# Patient Record
(demographics unavailable — no encounter records)

---

## 2017-09-23 NOTE — DIAGNOSTIC IMAGING REPORT
Indication: Chest pain



Technique: XRAY CHEST 1 V



Comparison: None



Findings: There is poor inspiration with bronchovascular crowding. 

Cardiomediastinal

silhouette is within normal limits. There is no consolidation or pleural 

effusion.

Osseous structures demonstrate no acute abnormality.



Impression:



Poor inspiration with bronchovascular crowding. Mild pulmonary vascular congestion

not excluded. Followup recommended.

## 2017-09-23 NOTE — HISTORY AND PHYSICAL
History of Present Illness


General


Date patient seen:  Sep 23, 2017


Time patient seen:  12:00


Reason for Hospitalization:  Chest Pain





Present Illness


HPI


36y/old  female presented with c/o  right sided chest pain x 1 month, worse 

today 


pain is constantly present 


radiates to the  back 


reports R side chest soreness 


no SOB, no exertional component,  


denies any change with exertion.   


denies fevers, chills, cough, congestion


no wheezing, 


admits to smoking marijuana on a daily basis, no cigarette smoker


denies illicit drug use, ETOH-socially


admits to lots of stress in her life, anxiety  





workup in ED revealed stable VS, pulse oz stable o RA 


labs unremarkable 


ECG with TWI  in lateral leads    


D dimer -321  


urine pregnancy test negative 


CXR  with Poor inspiration with bronchovascular crowding. Mild pulmonary 

vascular congestion not excluded.


mother  at 40  form heart attack


patient is being admitted to r/o ACS


Allergies:  


Coded Allergies:  


     No Known Allergies (Unverified , 17)





Medication History


Scheduled


No Known Medications* (NKM - No Known Medications*), 0 ., (Reported)





Patient History


Healthcare decision maker





Resuscitation status





Advanced Directive on File








Review of Systems


Constitutional:  Reports: no symptoms


Eye:  Reports: no symptoms


ENT:  Reports: no symptoms


Respiratory:  Reports: see HPI


Cardiovascular:  Reports: see HPI


Gastrointestinal:  Reports: no symptoms


Genitourinary:  Reports: no symptoms


Musculoskeletal:  Reports: no symptoms


Skin:  Reports: no symptoms


Psychiatric:  Reports: other - anxiety


Neurological:  Reports: no symptoms


Hematologic/Lymphatic:  Reports: no symptoms





Physical Exam


General Appearance:  no apparent distress, obese - A/A/O x 4 AA female


Lines, tubes and drains:  peripheral


HEENT:  normocephalic, atraumatic, anicteric, mucous membranes moist, PERRL


Neck:  non-tender, supple


Respiratory/Chest:  chest wall non-tender, lungs clear, normal breath sounds, 

no respiratory distress, no accessory muscle use


Cardiovascular/Chest:  normal peripheral pulses, normal rate - SR on jono  with 

TWI lateral leads , regular rhythm, no JVD


Abdomen:  normal bowel sounds, non tender, soft


Extremities:  normal range of motion, non-tender, normal inspection, no calf 

tenderness, normal capillary refill, no edema


Skin Exam:  normal pigmentation, warm/dry


Neurologic:  CNs II-XII grossly normal, no motor/sensory deficits, alert, 

oriented x 3, responsive, depressed affect





Last 24 Hour Vital Signs








  Date Time  Temp Pulse Resp B/P (MAP) Pulse Ox O2 Delivery O2 Flow Rate FiO2


 


17 10:45 98.9 59 15 130/74 100 Room Air  


 


17 08:32 99.0 62 20 134/54 100 Room Air  


 


17 08:31  60 20   Room Air  


 


17 08:07 99.1 62 14 145/87 100 Room Air  

















Intake and Output  


 


 17





 18:59 06:59


 


Intake Total 0 ml 


 


Balance 0 ml 


 


  


 


Intake Oral 0 ml 


 


# Voids 1 











Laboratory Tests








Test


  17


08:22 17


08:55


 


White Blood Count


  5.7 K/UL


(4.8-10.8) 


 


 


Red Blood Count


  4.34 M/UL


(4.20-5.40) 


 


 


Hemoglobin


  13.7 G/DL


(12.0-16.0) 


 


 


Hematocrit


  41.9 %


(37.0-47.0) 


 


 


Mean Corpuscular Volume 96 FL (80-99)   


 


Mean Corpuscular Hemoglobin


  31.6 PG


(27.0-31.0)  H 


 


 


Mean Corpuscular Hemoglobin


Concent 32.8 G/DL


(32.0-36.0) 


 


 


Red Cell Distribution Width


  12.8 %


(11.6-14.8) 


 


 


Platelet Count


  240 K/UL


(150-450) 


 


 


Mean Platelet Volume


  7.6 FL


(6.5-10.1) 


 


 


Neutrophils (%) (Auto)


  63.2 %


(45.0-75.0) 


 


 


Lymphocytes (%) (Auto)


  25.6 %


(20.0-45.0) 


 


 


Monocytes (%) (Auto)


  9.1 %


(1.0-10.0) 


 


 


Eosinophils (%) (Auto)


  0.9 %


(0.0-3.0) 


 


 


Basophils (%) (Auto)


  1.2 %


(0.0-2.0) 


 


 


D-Dimer


  321 ng/mL


(<500) 


 


 


Sodium Level


  139 mEQ/L


(135-145) 


 


 


Potassium Level


  3.8 mEQ/L


(3.4-4.9) 


 


 


Chloride Level


  102 mEQ/L


() 


 


 


Carbon Dioxide Level


  27 mEQ/L


(20-30) 


 


 


Anion Gap 10 (5-15)   


 


Blood Urea Nitrogen


  8 mg/dL (7-23)


  


 


 


Creatinine


  0.7 mg/dL


(0.5-0.9) 


 


 


Estimat Glomerular Filtration


Rate > 60 mL/min


(>60) 


 


 


Glucose Level


  120 mg/dL


()  H 


 


 


Calcium Level


  8.7 mg/dL


(8.6-10.2) 


 


 


Total Bilirubin


  0.3 mg/dL


(0.0-1.2) 


 


 


Aspartate Amino Transf


(AST/SGOT) 15 U/L (5-40)  


  


 


 


Alanine Aminotransferase


(ALT/SGPT) 17 U/L (3-33)  


  


 


 


Alkaline Phosphatase


  58 U/L


() 


 


 


Total Creatine Kinase


  259 U/L


()  H 


 


 


Creatine Kinase MB


  1.8 ng/mL (<


3.8) 


 


 


Creatine Kinase MB Relative


Index 0.6  


  


 


 


Troponin I


  < 0.30 ng/mL


(<=0.30) 


 


 


Pro-B-Type Natriuretic Peptide


  20 pg/mL


(0-125) 


 


 


Total Protein


  7.4 g/dL


(6.6-8.7) 


 


 


Albumin


  4.4 g/dL


(3.5-5.2) 


 


 


Globulin 3.0 g/dL   


 


Albumin/Globulin Ratio 1.4 (1.0-2.7)   


 


Urine HCG, Qualitative  Negative  








Height (Feet):  5


Height (Inches):  4.00


Weight (Pounds):  207





Assessment/Plan


Assessment/Plan


ASSESSMENT


chest pain


r/o ACS 


abnormal ECG with TWI laterally 


anxiety/depression


family hx of early cardiac death 


obesity  


marijuana smoker     





PLAN  OF CARE 


tele 


serial troponin


repeat ECG in am 


ECHO 


cardio eval 


pain management with nitro and Morphine prn 


start ASA 


lipid panel


consider psych eval to start on anxiolytic therapy 


 to decrease marijuana use


DVT GI prophylaxis   





case discussed and evaluated by supervising physician











Johnnie (Eugenioronda)Ana NP Sep 23, 2017 12:54

## 2017-09-23 NOTE — EMERGENCY ROOM REPORT
History of Present Illness


General


Chief Complaint:  chest pain


Source:  Patient





Present Illness


HPI


Patient is a 36-year-old female presented after having increased right-sided 

chest pain.  She describes this as a throbbing sensation.  The patient states 

that she had increased right-sided chest soreness.  She denies any change with 

exertion.  She states that this had been present constantly for about a day.  

She prior history of marijuana smoking daily but denies cigarette smoker.  She 

reports having no change in the pain with deep breath.


Allergies:  


Coded Allergies:  


     No Known Allergies (Unverified , 9/23/17)





Patient History


Past Medical History:  see triage record


Reviewed Nursing Documentation:  PMH: Agreed, PSxH: Agreed





Review of Systems


All Other Systems:  negative except mentioned in HPI





Physical Exam


Sp02 EP Interpretation:  reviewed, normal


General Appearance:  normal inspection, well appearing, no apparent distress, 

alert, GCS 15


Head:  atraumatic


ENT:  normal ENT inspection, hearing grossly normal, normal voice


Neck:  normal inspection, full range of motion, supple, no bony tend


Respiratory:  normal inspection, lungs clear, normal breath sounds, no 

respiratory distress, no retraction, no wheezing


Cardiovascular #1:  regular rate, rhythm, no edema


Gastrointestinal:  normal inspection, normal bowel sounds, non tender, soft, no 

guarding, no hernia


Genitourinary:  no CVA tenderness


Musculoskeletal:  normal inspection, back normal, normal range of motion


Neurologic:  normal inspection, alert, oriented x3, responsive, CNs III-XII nml 

as tested, speech normal


Psychiatric:  normal inspection, judgement/insight normal, mood/affect normal


Skin:  normal inspection, normal color, no rash





Medical Decision Making


Diagnostic Impression:  


 Primary Impression:  


 Chest pain


 Additional Impression:  


 ACS (acute coronary syndrome)


ER Course


Patient presented for a chest pain.  Differential diagnosis included but was 

not limited to acute coronary syndrome, pulmonary embolism, pneumonia, aortic 

dissection, shingles, pneumothorax, aortic dissection, esophageal rupture, 

pericarditis. Because of complexity of patient's case laboratory testing and 

imaging studies were ordered.A d-dimer was noted be negative.  EKG interpreted 

by me showed normal sinus rhythm with a rate of 60s with nonspecific T-wave 

inversion.  Patient was given aspirin as well as pain medication.  A chest x-

ray one view interpreted by me showed low lung volumes without evident 

infiltrate normal cardiac size.  Because  the patient's concerning history 

patient be admitted for further evaluation to Dr. wilde





Labs








Test


  9/23/17


08:22 9/23/17


08:55


 


White Blood Count


  5.7 K/UL


(4.8-10.8) 


 


 


Red Blood Count


  4.34 M/UL


(4.20-5.40) 


 


 


Hemoglobin


  13.7 G/DL


(12.0-16.0) 


 


 


Hematocrit


  41.9 %


(37.0-47.0) 


 


 


Mean Corpuscular Volume 96 FL (80-99)  


 


Mean Corpuscular Hemoglobin


  31.6 PG


(27.0-31.0) 


 


 


Mean Corpuscular Hemoglobin


Concent 32.8 G/DL


(32.0-36.0) 


 


 


Red Cell Distribution Width


  12.8 %


(11.6-14.8) 


 


 


Platelet Count


  240 K/UL


(150-450) 


 


 


Mean Platelet Volume


  7.6 FL


(6.5-10.1) 


 


 


Neutrophils (%) (Auto)


  63.2 %


(45.0-75.0) 


 


 


Lymphocytes (%) (Auto)


  25.6 %


(20.0-45.0) 


 


 


Monocytes (%) (Auto)


  9.1 %


(1.0-10.0) 


 


 


Eosinophils (%) (Auto)


  0.9 %


(0.0-3.0) 


 


 


Basophils (%) (Auto)


  1.2 %


(0.0-2.0) 


 


 


D-Dimer


  321 ng/mL


(<500) 


 


 


Sodium Level


  139 mEQ/L


(135-145) 


 


 


Potassium Level


  3.8 mEQ/L


(3.4-4.9) 


 


 


Chloride Level


  102 mEQ/L


() 


 


 


Carbon Dioxide Level


  27 mEQ/L


(20-30) 


 


 


Anion Gap 10 (5-15)  


 


Blood Urea Nitrogen 8 mg/dL (7-23)  


 


Creatinine


  0.7 mg/dL


(0.5-0.9) 


 


 


Estimat Glomerular Filtration


Rate > 60 mL/min


(>60) 


 


 


Glucose Level


  120 mg/dL


() 


 


 


Calcium Level


  8.7 mg/dL


(8.6-10.2) 


 


 


Total Bilirubin


  0.3 mg/dL


(0.0-1.2) 


 


 


Aspartate Amino Transf


(AST/SGOT) 15 U/L (5-40) 


  


 


 


Alanine Aminotransferase


(ALT/SGPT) 17 U/L (3-33) 


  


 


 


Alkaline Phosphatase


  58 U/L


() 


 


 


Total Creatine Kinase


  259 U/L


() 


 


 


Creatine Kinase MB


  1.8 ng/mL (<


3.8) 


 


 


Creatine Kinase MB Relative


Index 0.6 


  


 


 


Troponin I


  < 0.30 ng/mL


(<=0.30) 


 


 


Pro-B-Type Natriuretic Peptide


  20 pg/mL


(0-125) 


 


 


Total Protein


  7.4 g/dL


(6.6-8.7) 


 


 


Albumin


  4.4 g/dL


(3.5-5.2) 


 


 


Globulin 3.0 g/dL  


 


Albumin/Globulin Ratio 1.4 (1.0-2.7)  


 


Urine HCG, Qualitative  Negative 








Status:  unchanged


Disposition:  ADMITTED AS INPATIENT











Satya Scott Sep 23, 2017 08:10

## 2017-09-24 NOTE — PULMONOLOGY PROGRESS NOTE
Assessment/Plan


Assessment/Plan


ASSESSMENT


chest pain


r/o ACS 


abnormal ECG with TWI laterally 


anxiety/depression


family hx of early cardiac death 


obesity  


marijuana smoker     


hypercholesteremia 





PLAN  OF CARE 


tele 


serial troponin negative


repeat ECG this am -TWI lateral 


ECHO done, results pending 


cardio eval 


pain management with nitro and Morphine prn 


on ASA 


CXR no acute cardiopulmonary pathology


O2 to keep sat above 92%


pulmonary toilet prn


Venous Duplex BLE stat


D dimer WNL


probably low suspicion for PE ( no precipitating factors: no hormonal therapy, 

prolonged immobilization, malignancy),


however, will do CTA  


lipid panel with elevated LDL, 


 on low fat low cholesterol diet and need to lose weight


3 months TLC , then repeat lipid panel if still elevated LDL, start statin 


psych eval pending 


 to decrease marijuana use


DVT GI prophylaxis   





case discussed and evaluated by supervising physician





Subjective


Allergies:  


Coded Allergies:  


     No Known Allergies (Unverified , 9/23/17)


Subjective


still intermittent chest pain


worse with deep inspiration 


on O2 pulse oximetry stable, no tachypnea





Objective





Last 24 Hour Vital Signs








  Date Time  Temp Pulse Resp B/P (MAP) Pulse Ox O2 Delivery O2 Flow Rate FiO2


 


9/24/17 08:24 97.7 74 18 146/94 99 Room Air  


 


9/24/17 04:18 98.0 53 16 137/81  Room Air  


 


9/24/17 04:00  57      


 


9/24/17 00:06 98.1 58 16 137/92  Room Air  


 


9/24/17 00:00  67      


 


9/23/17 20:11 98.0 58 16 133/87  Room Air  


 


9/23/17 20:00  58      


 


9/23/17 16:00  61      


 


9/23/17 14:02 98.8  16 125/68 100 Room Air  


 


9/23/17 13:53 98.8 64 16 125/68 100 Room Air  


 


9/23/17 10:45 98.9 59 15 130/74 100 Room Air  

















Intake and Output  


 


 9/24/17 9/25/17





 19:00 07:00


 


Intake Total 120 ml 


 


Balance 120 ml 


 


  


 


Intake Oral 120 ml 








Objective


General Appearance:  no apparent distress, obese - A/A/O x 4 AA female


Lines, tubes and drains:  peripheral


HEENT:  normocephalic, atraumatic, anicteric, mucous membranes moist, PERRL


Neck:  non-tender, supple


Respiratory/Chest:  chest wall non-tender, lungs clear, normal breath sounds, 

no respiratory distress, no accessory muscle use


Cardiovascular/Chest:  normal peripheral pulses, normal rate - SR on jono  with 

TWI lateral leads , regular rhythm, no JVD


Abdomen:  normal bowel sounds, non tender, soft


Extremities:  normal range of motion, non-tender, normal inspection, no calf 

tenderness, normal capillary refill, no edema


Skin Exam:  normal pigmentation, warm/dry


Neurologic:  CNs II-XII grossly normal, no motor/sensory deficits, alert, 

oriented x 3, responsive, depressed affect


Laboratory Tests


9/23/17 08:55: Urine HCG, Qualitative Negative


9/23/17 15:30: Troponin I < 0.30


9/24/17 06:00: 


Troponin I < 0.30, White Blood Count 4.4L, Red Blood Count 4.22, Hemoglobin 13.7

, Hematocrit 40.5, Mean Corpuscular Volume 96, Mean Corpuscular Hemoglobin 32.6H

, Mean Corpuscular Hemoglobin Concent 33.9, Red Cell Distribution Width 12.7, 

Platelet Count 233, Mean Platelet Volume 7.1, Neutrophils (%) (Auto) 37.9L, 

Lymphocytes (%) (Auto) 47.8H, Monocytes (%) (Auto) 11.6H, Eosinophils (%) (Auto

) 1.5, Basophils (%) (Auto) 1.2, Sodium Level 143, Potassium Level 3.7, 

Chloride Level 105, Carbon Dioxide Level 27, Anion Gap 11, Blood Urea Nitrogen 9

, Creatinine 0.8, Estimat Glomerular Filtration Rate > 60, Glucose Level 99, 

Hemoglobin A1c 5.3, Calcium Level 8.7, Magnesium Level 2.2, Triglycerides Level 

106, Cholesterol Level 196, LDL Cholesterol 128H, HDL Cholesterol 47, 

Cholesterol/HDL Ratio 4.2, Thyroid Stimulating Hormone (TSH) 1.820





Current Medications








 Medications


  (Trade)  Dose


 Ordered  Sig/Juan


 Route


 PRN Reason  Start Time


 Stop Time Status Last Admin


Dose Admin


 


 Acetaminophen


  (Tylenol)  650 mg  Q4H  PRN


 ORAL


 Mild Pain (Pain Scale 1-3)  9/23/17 13:30


 10/23/17 13:29   


 


 


 Al Hydroxide/Mg


 Hydroxide


  (Mylanta II)  30 ml  Q6H  PRN


 ORAL


 dyspepsia  9/23/17 13:30


 10/23/17 13:29   


 


 


 Albuterol/


 Ipratropium


  (DuoNeb


 0.5-3(2.5)mg/3ml)  3 ml  Q4H  PRN


 HHN


 Shortness of Breath  9/23/17 13:30


 9/28/17 13:29   


 


 


 Aspirin


  (ASA)  81 mg  DAILY


 ORAL


   9/24/17 09:00


 10/24/17 08:59   


 


 


 Dextrose


  (Dextrose 50%)    STAT  PRN


 IV


 Hypoglycemia  9/23/17 13:30


 10/23/17 13:29   


 


 


 Heparin Sodium


  (Porcine)


  (Heparin 5000


 units/ml)  5,000 units  EVERY 12  HOURS


 SUBQ


   9/23/17 21:00


 10/23/17 20:59  9/23/17 20:06


 


 


 Morphine Sulfate


  (Morphine


 Sulfate)  2 mg  Q4H  PRN


 IVP


 Severe Pain (Pain Scale 7-10)  9/24/17 07:30


 9/30/17 13:29  9/24/17 07:48


 


 


 Nitroglycerin


  (Ntg)  0.4 mg  Q5M  PRN


 SL


 Prn Chest Pain  9/23/17 13:30


 10/23/17 13:29   


 


 


 Ondansetron HCl


  (Zofran)  4 mg  Q6H  PRN


 IVP


 Nausea & Vomiting  9/23/17 13:30


 10/23/17 13:29   


 


 


 Polyethylene


 Glycol


  (Miralax)  17 gm  DAILYPRN  PRN


 ORAL


 Constipation  9/23/17 13:30


 10/23/17 13:29   


 


 


 Ranitidine HCl


  (Zantac)  150 mg  DAILY


 ORAL


   9/24/17 09:00


 10/24/17 08:59   


 


 


 Temazepam


  (Restoril)  15 mg  QHS  PRN


 ORAL


 Insomnia  9/23/17 21:00


 9/30/17 20:59   


 

















Johnnie OrtegaAna cochran NP Sep 24, 2017 08:39

## 2017-09-24 NOTE — CARDIOLOGY REPORT
--------------- APPROVED REPORT --------------





EXAM: Two-dimensional and M-mode echocardiogram with Doppler and color 

Doppler.



INDICATION

Chest Pain 



M-Mode DIMENSIONS 

IVSd0.6 (0.7-1.1cm)Left Atrium (MM)3.3 (1.6-4.0cm)

LVDd4.8 (3.5-5.6cm)Aortic Root2.5 (2.0-3.7cm)

PWd0.6 (0.7-1.1cm)Aortic Cusp Exc.1.9 (1.5-2.0cm)

IVSs1.1 cm

LVDs2.8 (2.5-4.0cm)

PWs1.1 cm





Aortic Valve

AoV VTI24.3cmAO Peak GR.309mmHgAI P 1/2 [id number]

AO Mean GR.814mmHg

LVOT [id number]/s



Mitral Valve

PRESS 1/2 [id number] E Velocity0.90cm/sMV A Velocity0.64cm/s

DECEL [id number] ratio1.4

MR Peak Velocity2.03cm/s



TDI

E/Lateral E'0.1E/Medial E'0.1

Lateral E' Peak V13cm/sMedial E' Peak V12cm/s



Tricuspid Valve

TR Peak Velocity2.5cm/s





Normal left ventricular chamber size, systolic function and wall motion.

Left ventricular ejection fraction estimated to be 65-70%.

No evidence of pericardial effusion

A  color flow and spectral Doppler study was performed and revealed:

Mild Triscuspid regurgitation

RVSP 30.54 mmHg

Trace Mitral regurgitation

IVC at normal size with / without physiologic collapse

## 2017-09-24 NOTE — DIAGNOSTIC IMAGING REPORT
Indication: Chest pain



Technique: XRAY CHEST 1 V



Comparison: 9/23/17



Findings: Cardiomediastinal silhouette is stable. There is no consolidation,

pneumothorax or pleural effusion. Osseous structures are stable.



Impression:



No acute cardiopulmonary disease.

## 2017-09-25 NOTE — CONSULTATION
History of Present Illness


General


Date patient seen:  Sep 24, 2017


Chief Complaint:  Chest Pain





Present Illness


HPI


36y/old  female presented with c/o  right sided chest pain x 1 month, worse 

pain is constantly present radiates to the back. the pt has been pw anxiety and 

insomnia for the past several weeks. the pt target sxs: fatigue, anxiety, 

decrease appetite, worrisome. no depressive sxs, no psychotic/manic sxs


Allergies:  


Coded Allergies:  


     No Known Allergies (Unverified , 9/23/17)





Medication History


Scheduled


Albuterol Sulfate* (Albuterol Sulfate Mdi*), 2 PUFF INH Q3H


Aspirin* (Aspirin*), 81 MG ORAL DAILY


No Known Medications* (NKM - No Known Medications*), 0 ., (Reported)


Paroxetine Hcl (Paxil), 20 MG PO QHS, (Reported)


Paroxetine Hcl (Paxil), 20 MG PO QHS, (Reported)


Paroxetine Hcl* (Paxil*), 20 MG ORAL QHS, (Reported)





Patient History


History Provided By:  Patient, Medical Record, PMD


Healthcare decision maker





Resuscitation status


Full Code


Advanced Directive on File








Past Medical/Surgical History


Past Medical/Surgical History:  


(1) Chest pain


(2) Anxiety


(3) Costochondritis


(4) GERD (gastroesophageal reflux disease)


(5) ACS (acute coronary syndrome)





Social History


Social History:  


(1) Chest pain


(2) Anxiety


(3) Costochondritis


(4) GERD (gastroesophageal reflux disease)


(5) ACS (acute coronary syndrome)





Review of Systems


Constitutional:  Reports: malaise, weakness


Psychiatric:  Reports: prior hx, anxiety, emotional problems





Physical Exam


General Appearance:  alert, mild distress, overweight


Neurologic:  alert, oriented x 3, responsive, depressed affect





Last 24 Hour Vital Signs








  Date Time  Temp Pulse Resp B/P (MAP) Pulse Ox O2 Delivery O2 Flow Rate FiO2


 


9/25/17 12:00  69      


 


9/25/17 08:00  62      


 


9/25/17 08:00 97.8 62 20 146/96 100 Room Air  


 


9/25/17 07:42  68 20  99 Nasal Cannula 4.0 36


 


9/25/17 07:40  67 20   Nasal Cannula 4.0 36


 


9/25/17 07:39        32


 


9/25/17 07:38  61 22  98 Nasal Cannula 4.0 


 


9/25/17 04:00  63      


 


9/25/17 04:00 97.0 54 20 136/83 97 Room Air 2.0 21


 


9/25/17 00:00  71      


 


9/25/17 00:00 97.3 64 20 132/92 99 Room Air  


 


9/24/17 20:00  55      


 


9/24/17 20:00 97.0 74 18 138/87 100 Nasal Cannula 2.0 


 


9/24/17 19:30  75 16   Room Air  21


 


9/24/17 16:00  69      


 


9/24/17 15:57 97.2 60 18 141/90 100 Nasal Cannula 2.0 











Laboratory Tests








Test


  9/25/17


07:00


 


Troponin I


  < 0.30 ng/mL


(<=0.30)








Height (Feet):  5


Height (Inches):  4.00


Weight (Pounds):  207


Medications





Current Medications








 Medications


  (Trade)  Dose


 Ordered  Sig/Juan


 Route


 PRN Reason  Start Time


 Stop Time Status Last Admin


Dose Admin


 


 Acetaminophen


  (Tylenol)  650 mg  Q4H  PRN


 ORAL


 Mild Pain (Pain Scale 1-3)  9/23/17 13:30


 10/23/17 13:29   


 


 


 Al Hydroxide/Mg


 Hydroxide


  (Mylanta II)  30 ml  Q6H  PRN


 ORAL


 dyspepsia  9/23/17 13:30


 10/23/17 13:29   


 


 


 Albuterol/


 Ipratropium


  (DuoNeb


 0.5-3(2.5)mg/3ml)  3 ml  Q4H  PRN


 HHN


 Shortness of Breath  9/23/17 13:30


 9/28/17 13:29  9/25/17 07:38


 


 


 Aspirin


  (ASA)  81 mg  DAILY


 ORAL


   9/24/17 09:00


 10/24/17 08:59  9/25/17 08:10


 


 


 Dextrose


  (Dextrose 50%)    STAT  PRN


 IV


 Hypoglycemia  9/23/17 13:30


 10/23/17 13:29   


 


 


 Heparin Sodium


  (Porcine)


  (Heparin 5000


 units/ml)  5,000 units  EVERY 12  HOURS


 SUBQ


   9/23/17 21:00


 10/23/17 20:59  9/25/17 08:11


 


 


 Morphine Sulfate


  (Morphine


 Sulfate)  2 mg  Q4H  PRN


 IVP


 Severe Pain (Pain Scale 7-10)  9/24/17 07:30


 9/30/17 13:29  9/25/17 14:33


 


 


 Nitroglycerin


  (Ntg)  0.4 mg  Q5M  PRN


 SL


 Prn Chest Pain  9/23/17 13:30


 10/23/17 13:29   


 


 


 Ondansetron HCl


  (Zofran)  4 mg  Q6H  PRN


 IVP


 Nausea & Vomiting  9/23/17 13:30


 10/23/17 13:29   


 


 


 Pantoprazole


  (Protonix)  40 mg  BIAC


 ORAL


   9/25/17 16:30


 10/25/17 16:29   


 


 


 Polyethylene


 Glycol


  (Miralax)  17 gm  DAILYPRN  PRN


 ORAL


 Constipation  9/23/17 13:30


 10/23/17 13:29   


 


 


 Sucralfate


  (Carafate)  1 gm  FOUR TIMES A  DAY


 ORAL


   9/25/17 13:00


 10/25/17 12:59  9/25/17 14:33


 


 


 Temazepam


  (Restoril)  15 mg  QHS  PRN


 ORAL


 Insomnia  9/23/17 21:00


 9/30/17 20:59   


 











Assessment/Plan


Status:  stable


Assessment/Plan


Anxiety d/o, Panic d/o





Paxil 20mg qhs


provided neelima/Zamzam Austin M.D. Sep 25, 2017 15:00

## 2017-09-25 NOTE — DIAGNOSTIC IMAGING REPORT
ndication: Shortness of breath and chest pain



Technique: IV administration nonionic contrast. Spiral acquisitions obtained from

the lung bases to the lung apices. Multiplanar and 3-D reconstructions were

generated. Total dose length product 1525 mGycm. CTDIvol(s) 12x3, 25, 45 mGy. 

Dose

reduction achieved using automated exposure control





Comparison: None



Findings: There is good quality opacification of the pulmonary arteries. No

intraluminal filling defects or other findings to suggest acute pulmonary 

embolus

demonstrated. Normal caliber pulmonary arteries. No evidence of right 

ventricular

dilatation or atrial septal bowing. Heart size is normal. No evidence of thoracic

aortic aneurysm or dissection.



The lungs are clear. No acute infiltrates, effusions, or congestion. No mediastinal

or hilar mass or adenopathy. No pericardial effusion. Visualized thyroid is

unremarkable. No axillary or chest wall mass or adenopathy demonstrated. The bones

are unremarkable.



The included upper abdominal anatomy is unremarkable.



Impression: Essentially unremarkable exam. Negative for acute pulmonary embolus or

other thoracic pathology.



This agrees with the preliminary interpretation provided overnight by Dr. Saavedra



The CT scanner at [facility] is accredited by the [employer] 

of

Radiology and the scans are performed using protocols designed to limit 

radiation

exposure to as low as reasonably achievable to attain images of sufficient

resolution adequate for diagnostic evaluation.

## 2017-09-25 NOTE — DIAGNOSTIC IMAGING REPORT
--------------- APPROVED REPORT --------------





CPT Code: 43637



Present Symptoms

Lower Extremity Pain:  Bilateral 





BILATERAL: Imaging reveals a patent deep venous system bilaterally. There is no evidence 

of thrombus within the femoral, popliteal or tibial segments. The greater saphenous veins 

are also within normal limits. Doppler indicates normal spontaneous flow within these 

segments.

## 2017-09-25 NOTE — PULMONOLOGY PROGRESS NOTE
Assessment/Plan


Problems:  


(1) ACS (acute coronary syndrome)


(2) Costochondritis


(3) GERD (gastroesophageal reflux disease)


(4) Anxiety


Assessment/Plan


echo, CT angio noted





stress study pending





Subjective


ROS Limited/Unobtainable:  No


Interval Events:  doing ok, still chest pain


Allergies:  


Coded Allergies:  


     No Known Allergies (Unverified , 9/23/17)





Objective





Last 24 Hour Vital Signs








  Date Time  Temp Pulse Resp B/P (MAP) Pulse Ox O2 Delivery O2 Flow Rate FiO2


 


9/25/17 08:00 97.8 62 20 146/96 100 Room Air  


 


9/25/17 07:42  68 20  99 Nasal Cannula 4.0 36


 


9/25/17 07:40  67 20   Nasal Cannula 4.0 36


 


9/25/17 07:39        32


 


9/25/17 07:38  61 22  98 Nasal Cannula 4.0 


 


9/25/17 04:00  63      


 


9/25/17 04:00 97.0 54 20 136/83 97 Room Air 2.0 21


 


9/25/17 00:00  71      


 


9/25/17 00:00 97.3 64 20 132/92 99 Room Air  


 


9/24/17 20:00  55      


 


9/24/17 20:00 97.0 74 18 138/87 100 Nasal Cannula 2.0 


 


9/24/17 19:30  75 16   Room Air  21


 


9/24/17 16:00  69      


 


9/24/17 15:57 97.2 60 18 141/90 100 Nasal Cannula 2.0 








General Appearance:  WD/WN


HEENT:  normocephalic, atraumatic


Respiratory/Chest:  chest wall non-tender, lungs clear


Breasts:  no masses


Cardiovascular:  normal peripheral pulses, normal rate


Abdomen:  normal bowel sounds, soft, non tender


Genitourinary:  normal external genitalia


Extremities:  no cyanosis


Skin:  no ulcers


Neurologic/Psychiatric:  CNs II-XII grossly normal, responsive, normal mood/

affect


Laboratory Tests


9/25/17 07:00: Troponin I < 0.30





Current Medications








 Medications


  (Trade)  Dose


 Ordered  Sig/Juan


 Route


 PRN Reason  Start Time


 Stop Time Status Last Admin


Dose Admin


 


 Acetaminophen


  (Tylenol)  650 mg  Q4H  PRN


 ORAL


 Mild Pain (Pain Scale 1-3)  9/23/17 13:30


 10/23/17 13:29   


 


 


 Al Hydroxide/Mg


 Hydroxide


  (Mylanta II)  30 ml  Q6H  PRN


 ORAL


 dyspepsia  9/23/17 13:30


 10/23/17 13:29   


 


 


 Albuterol/


 Ipratropium


  (DuoNeb


 0.5-3(2.5)mg/3ml)  3 ml  Q4H  PRN


 HHN


 Shortness of Breath  9/23/17 13:30


 9/28/17 13:29  9/25/17 07:38


 


 


 Aspirin


  (ASA)  81 mg  DAILY


 ORAL


   9/24/17 09:00


 10/24/17 08:59  9/25/17 08:10


 


 


 Dextrose


  (Dextrose 50%)    STAT  PRN


 IV


 Hypoglycemia  9/23/17 13:30


 10/23/17 13:29   


 


 


 Heparin Sodium


  (Porcine)


  (Heparin 5000


 units/ml)  5,000 units  EVERY 12  HOURS


 SUBQ


   9/23/17 21:00


 10/23/17 20:59  9/25/17 08:11


 


 


 Morphine Sulfate


  (Morphine


 Sulfate)  2 mg  Q4H  PRN


 IVP


 Severe Pain (Pain Scale 7-10)  9/24/17 07:30


 9/30/17 13:29  9/25/17 07:34


 


 


 Nitroglycerin


  (Ntg)  0.4 mg  Q5M  PRN


 SL


 Prn Chest Pain  9/23/17 13:30


 10/23/17 13:29   


 


 


 Ondansetron HCl


  (Zofran)  4 mg  Q6H  PRN


 IVP


 Nausea & Vomiting  9/23/17 13:30


 10/23/17 13:29   


 


 


 Polyethylene


 Glycol


  (Miralax)  17 gm  DAILYPRN  PRN


 ORAL


 Constipation  9/23/17 13:30


 10/23/17 13:29   


 


 


 Ranitidine HCl


  (Zantac)  150 mg  DAILY


 ORAL


   9/24/17 09:00


 10/24/17 08:59  9/25/17 08:09


 


 


 Temazepam


  (Restoril)  15 mg  QHS  PRN


 ORAL


 Insomnia  9/23/17 21:00


 9/30/17 20:59   


 

















MAJOR BYNUM Sep 25, 2017 12:36

## 2017-09-26 NOTE — DISCHARGE SUMMARY
Discharge Summary


Hospital Course


Date of Admission


Sep 23, 2017 at 10:43


Date of Discharge


Sep 25, 2017 at 18:11


Admitting Diagnosis


chest pain, acute coronary syndrome


HPI


Edna Peres is a 36 year old female who was admitted on Sep 23, 2017 at 10:43 

for Chest Pain,Acute Coronary Syndrome


Hospital Course


dc summary #[id number]





Discharge Medications


New Medications:  


Albuterol Sulfate* (Albuterol Sulfate Mdi*) 8.5 Gm Hfa.aer.ad


2 PUFF INH Q3H, #1 INH 0 Refills





Aspirin* (Aspirin*) 81 Mg Tab.chew


81 MG ORAL DAILY for 30 Days, TAB





 


Continued Medications:  


Paroxetine Hcl* (Paxil*) 10 Mg Tablet


20 MG ORAL QHS for 30 Days, TAB 0 Refills











Discharge


Condition Upon Discharge:  stable


Discharge Disposition


Patient was discharged to Home (01)


Discharge Diagnoses:  











Bravo (Chay)Ana NP Sep 26, 2017 14:36

## 2017-09-27 NOTE — DISCHARGE SUMMARY 2 SIG
DATE OF ADMISSION:  2017



DATE OF DISCHARGE:  2017



Reason For Admission:  36-year-old female presented with

complaint of right-sided chest pain for one month, worse on the day of

presentation.  Pain was constantly present, radiated to the back.  The

patient reported right-sided chest soreness.  No shortness of breath.  No

exertional component.  She denied any change with exertion.  She denied

fever, chills, cough, congestion, or wheezing.  She admitted to smoking

marijuana on a daily basis, but no cigarette smoking.  She denied illicit

drug use.  Using alcohol socially.  She admitted to lots of stress in her

life recently and anxiety.  Workup in the emergency room revealed stable

vital signs.  Pulse oximetry was stable on room air.  Laboratories were

unremarkable.  EKG revealed T-wave inversion in lateral leads.  D-dimer

was 321.  Urine pregnancy test was negative.  Chest x-ray revealed poor

inspiration with bronchovascular crowding.  Mild pulmonary vascular

congestion was not excluded.  Mother  at 40 from heart attack.  The

patient was admitted to rule out acute coronary syndrome.



ADMITTING DIAGNOSES:

1. Chest pain, 

2. Rule out acute coronary syndrome.  

3. Abnormal EKG with T-wave inversion laterally.

4. Anxiety/depression.

5. Family history of early cardiac death.

6. Obesity.

7. Marijuana smoker.



Hospital Course:  The patient was admitted to telemetry floor.  Serial

troponin were negative.  Repeated EKG as well as intiial did not show 

any acute ischemic changes.  Therefore, the patient was ruled out for  

acute myocardial infarction as per protocol.  Cardiology evaluation was 
requested.

Echocardiogram revealed ejection fraction of 65% to 70%.  No wall motion

abnormalities noted.  Initial chest x-ray revealed poor inspiration with

bronchovascular crowding.  Mild pulmonary vascular congestion was not

excluded.  Followup chest x-ray the next day revealed no acute

cardiopulmonary disease.  The patient complained of the chest pain worse

with deep inspiration.  Since chest x-ray was negative, venous duplex and

CTA were done.  Venous duplex was negative for any evidence of acute DVT

and CTA of the chest revealed no evidence of pulmonary emboli and was negative

for other thoracic pathology.  Spoke with cardiologist , dr Floyd, who 
suggested 

to have treadmill stress test given the fact that she had a strong family

history of early cardiac death in the family.  Treadmill stress test was

done, was unremarkable.  Lipid panel was stable except elevated LDL of

128. Patient was counseled on a low-cholesterol and low-fat diet and need to 

lose weight. Recommended three months therapeutic lifestyle changes and 

repeat lipid panel.  If still elevated LDL, then recommend to start statin.    



In addition, psychiatric consultation was requested secondary to the patient's 

anxiety and depression since it was felt that chest pain was noncardiac and 
likely

related to anxiety or depression.  Psychiatrist had seen and evaluated the

patient and started the patient on Paxil  , in addition she provided reality 

orientation.The patient was counseled to find ways to reduce stress in her life 

and different ways to relieve anxiety along with

the medication

Patient's  condition was discussed with cardiologist who felt   importance of 
ordering 

treadmill stress test, given family history of early cardiac death . However, 
if negative ,

cardiologist recommended to discharge  home, and no need for cardiologist to 
see her. 

Treadmill stress test was negative as mentioned above.  

The patient was stable for discharge home.

  

FINAL DIAGNOSIS 



1. Noncardiac chest pain, likely related to anxiety and depression.

2. Anxiety/depression.

3. Abnormal EKG with T-wave inversion laterally, no acute changes.

4. Family history of early cardiac death.

5. Obesity.

6. Marijuana smoker.

7. Hypercholesterolemia.



  



DISCHARGE MEDICATIONS:  See medication reconciliation list.



Discharge Instructions:  The patient was discharged home.  Follow up with

the primary care provider.  







  ______________________________________________

  Rick Levi M.D.





  ______________________________________________

  [facility]





DR:  Don

D:  2017 14:36

T:  2017 09:50

JOB#:  [id number]

CC:



SERGEY

## 2017-09-29 NOTE — CARDIOLOGY REPORT
--------------- APPROVED REPORT --------------





EKG Measurement

Heart [id number]

MT [id number]

[id number]

[id number]

[id number]





Normal sinus rhythm with sinus arrhythmia

Nonspecific T wave abnormality

Abnormal ECG

## 2017-09-30 NOTE — EMERGENCY ROOM REPORT
History of Present Illness


General


Chief Complaint:  Chest Pain


Source:  Patient, Medical Record





Present Illness


HPI


Patient presents with complaints of chest pain


Complains of mid sternal discomfort


Some right-sided


She feels the pain has continued since she was discharged from the hospital





Denies any headache or visual changes denies any lower abdominal pain


Pain is sharp at times burning


Allergies:  


Coded Allergies:  


     No Known Allergies (Unverified , 9/23/17)





Patient History


Past Medical History:  see triage record


Pertinent Family History:  none


Last Menstrual Period:  09/25/17


Reviewed Nursing Documentation:  PMH: Agreed, PSxH: Agreed





Nursing Documentation-PMH


Past Medical History:  No History, Except For


Hx Cardiac Problems:  No


Hx Asthma:  Yes


Hx Cancer:  No


Hx Gastrointestinal Problems:  No


Hx Neurological Problems:  No





Review of Systems


All Other Systems:  negative except mentioned in HPI





Physical Exam





Vital Signs








  Date Time  Temp Pulse Resp B/P (MAP) Pulse Ox O2 Delivery O2 Flow Rate FiO2


 


9/28/17 12:23  62 18   Room Air  


 


9/28/17 12:23 98.2   147/77 100   








Sp02 EP Interpretation:  reviewed, normal


General Appearance:  mild distress - Patient appears uncomfortable


Head:  normocephalic, atraumatic


Eyes:  bilateral eye PERRL, bilateral eye EOMI


ENT:  hearing grossly normal, normal pharynx, TMs + canals normal, uvula midline


Neck:  full range of motion, supple, no meningismus, no bony tend


Respiratory:  lungs clear, normal breath sounds, no rhonchi, no respiratory 

distress, no retraction, no accessory muscle use


Cardiovascular #1:  normal peripheral pulses, regular rate, rhythm, no edema, 

no gallop, no JVD, no murmur


Gastrointestinal:  normal bowel sounds, non tender, soft, no mass, no 

organomegaly, non-distended, no guarding, no hernia, no pulsatile mass, no 

rebound


Musculoskeletal:  normal inspection


Neurologic:  oriented x3, responsive, CNs III-XII nml as tested, motor strength/

tone normal, sensory intact


Skin:  normal color, no rash, warm/dry, palpation normal


Lymphatic:  normal inspection, no adenopathy





Medical Decision Making


Diagnostic Impression:  


 Primary Impression:  


 Chest pain


ER Course


Patient is a fairly complex patient with multiple differential to consideration 

including but not limited to cardiac cardiopulmonary and vascular emergencies





Patient also has fairly pertinent family history





EKG was performed which is normal





Patient's recent hospitalization was reviewed


There is a cardiac echo, a CT chest and fairly extensive workup that was 

reviewed





Patient is here with family who are concerned about the patient's presentation


Did also request another exam for blood clot in the lungs





I did discuss the concerns regarding, repeat CAT scan imaging with contrast


This is not felt to be appropriate given the recent exam





The family is on for showing not satisfied with the evaluation and answers that 

they are being given


I did reiterate that there is no obvious emergent findings that have been found 

and that the patient is stable for close outpatient followup


EKG Diagnostic Results


Rate:  normal


Rhythm:  NSR


ST Segments:  no acute changes





Rhythm Strip Diag. Results


EP Interpretation:  yes


Rate:  77


Rhythm:  NSR, no PVC's, no ectopy





Last Vital Signs








  Date Time  Temp Pulse Resp B/P (MAP) Pulse Ox O2 Delivery O2 Flow Rate FiO2


 


9/28/17 12:51  68 20 134/81 100 Room Air  


 


9/28/17 12:23 98.2       








Status:  unchanged


Disposition:  HOME, SELF-CARE


Condition:  Stable


Referrals:  


PREFERRED [facility],REFERRING (PCP)


Patient Instructions:  Nonspecific Chest Pain





Additional Instructions:  


Patient is provided with the discharge instructions notified to follow up with 

primary doctor in the next 2-3 days otherwise return to the er with any 

worsening symptoms.


Please note that this report is being documented using DRAGON technology.  This 

can lead to erroneous entry secondary to incorrect interpretation by the 

dictating instrument.











NOLA RICHARD D.O. Sep 30, 2017 08:26
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
